# Patient Record
Sex: FEMALE | Race: WHITE | NOT HISPANIC OR LATINO | Employment: UNEMPLOYED | URBAN - NONMETROPOLITAN AREA
[De-identification: names, ages, dates, MRNs, and addresses within clinical notes are randomized per-mention and may not be internally consistent; named-entity substitution may affect disease eponyms.]

---

## 2024-05-08 ENCOUNTER — OFFICE VISIT (OUTPATIENT)
Dept: URGENT CARE | Facility: MEDICAL CENTER | Age: 39
End: 2024-05-08
Payer: COMMERCIAL

## 2024-05-08 VITALS
HEART RATE: 88 BPM | TEMPERATURE: 97.7 F | RESPIRATION RATE: 22 BRPM | DIASTOLIC BLOOD PRESSURE: 78 MMHG | OXYGEN SATURATION: 98 % | BODY MASS INDEX: 30.9 KG/M2 | SYSTOLIC BLOOD PRESSURE: 118 MMHG | WEIGHT: 180 LBS

## 2024-05-08 DIAGNOSIS — J02.9 PHARYNGITIS, UNSPECIFIED ETIOLOGY: Primary | ICD-10-CM

## 2024-05-08 DIAGNOSIS — T78.40XA ALLERGY, INITIAL ENCOUNTER: ICD-10-CM

## 2024-05-08 LAB — S PYO AG THROAT QL: NEGATIVE

## 2024-05-08 PROCEDURE — S9088 SERVICES PROVIDED IN URGENT: HCPCS

## 2024-05-08 PROCEDURE — 99213 OFFICE O/P EST LOW 20 MIN: CPT

## 2024-05-08 PROCEDURE — 87880 STREP A ASSAY W/OPTIC: CPT

## 2024-05-08 RX ORDER — LORATADINE 10 MG/1
10 TABLET ORAL DAILY
Qty: 30 TABLET | Refills: 0 | Status: SHIPPED | OUTPATIENT
Start: 2024-05-08

## 2024-05-08 RX ORDER — AZELASTINE 1 MG/ML
1 SPRAY, METERED NASAL 2 TIMES DAILY
Qty: 1 ML | Refills: 0 | Status: SHIPPED | OUTPATIENT
Start: 2024-05-08

## 2024-05-08 RX ORDER — MEDROXYPROGESTERONE ACETATE 150 MG/ML
INJECTION, SUSPENSION INTRAMUSCULAR
COMMUNITY
Start: 2024-04-30

## 2024-05-08 RX ORDER — ALBUTEROL SULFATE 90 UG/1
2 AEROSOL, METERED RESPIRATORY (INHALATION) EVERY 6 HOURS PRN
COMMUNITY
Start: 2023-12-21

## 2024-05-08 NOTE — LETTER
May 8, 2024     Patient: Amelia Anderson  YOB: 1985  Date of Visit: 5/8/2024      To Whom it May Concern:    Amelia Anderson is under my professional care. Amelia was seen in my office on 5/8/2024. Amelia may return to work on 5/9/2024 .    If you have any questions or concerns, please don't hesitate to call.         Sincerely,          ZEHRA Bates        CC: No Recipients

## 2024-05-08 NOTE — PATIENT INSTRUCTIONS
Using a daily antihistamine such as allegra, zyrtec, or claritin may be helpful. These can be purchased over the counter and taken as directed on package.  If handling animals, consider wearing gloves and washing hands after finished handling.   Take astelin nasal spray as directed.   Follow up with PCP if symptoms worsen. If breathing difficulty develops, please call 911 or proceed to nearest emergency room.

## 2024-05-08 NOTE — PROGRESS NOTES
[facility]        NAME: Amelia Anderson is a 39 y.o. female  : 1985    MRN: [id number]  DATE: May 8, 2024  TIME: 5:56 PM    Assessment and Plan   Sore throat [J02.9]  1. Sore throat  POCT rapid ANTIGEN strepA            Patient Instructions       Follow up with PCP in 3-5 days.  Proceed to  ER if symptoms worsen.    If tests are performed, our office will contact you with results only if changes need to made to the care plan discussed with you at the visit. [facility]    Chief Complaint     Chief Complaint   Patient presents with    Nasal Congestion    Cough    Sore Throat     Throat feel like its on fire. SX started 4 days ago. Has slight cough and nasal congestion. Needs refill on her albuterol inhaler. No fever or N/V. Had a little diarrhea yesterday.          History of Present Illness       Cough, congestion, chest tightness, sore throat x4 days. Pt states they recently got kittens 1 week ago and she is allergic to animal dander. States she had contact with the kittens as they need to be bottle fed. Denies fevers, + chills. Cough dry and nonproductive. 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     Cough  Associated symptoms include chills, ear pain, postnasal drip, rhinorrhea and a sore throat. Pertinent negatives include no fever.   Sore Throat   Associated symptoms include congestion, coughing and ear pain. Pertinent negatives include no abdominal pain, diarrhea or vomiting.       Review of Systems   Review of Systems   Constitutional:  Positive for chills and fatigue. Negative for fever.   HENT:  Positive for congestion, ear pain, postnasal drip, rhinorrhea and sore throat.    Respiratory:  Positive for cough.    Gastrointestinal:  Negative for abdominal pain, diarrhea, nausea and vomiting.         Current Medications       Current Outpatient Medications:     albuterol (PROVENTIL HFA,VENTOLIN  HFA) 90 mcg/act inhaler, Inhale 2 puffs every 6 (six) hours as needed, Disp: , Rfl:     medroxyPROGESTERone acetate (DEPO-PROVERA SYRINGE) 150 mg/mL injection, INJECT 1 MILLILITERS INTO MUSCLE FOR A 1 TIME DOSE, Disp: , Rfl:     famotidine (PEPCID) 20 mg tablet, Take 1 tablet (20 mg total) by mouth 2 (two) times a day (Patient not taking: Reported on 5/8/2024), Disp: 10 tablet, Rfl: 0    loratadine (CLARITIN) 10 mg tablet, Take 1 tablet (10 mg total) by mouth daily (Patient not taking: Reported on 5/8/2024), Disp: 10 tablet, Rfl: 0    mupirocin (BACTROBAN) 2 % cream, Apply topically 3 times per day and cover with a clean/dry bandage. (Patient not taking: Reported on 5/8/2024), Disp: 15 g, Rfl: 0    naproxen (NAPROSYN) 500 mg tablet, Take 1 tablet (500 mg total) by mouth 2 (two) times a day with meals (Patient not taking: Reported on 5/8/2024), Disp: 14 tablet, Rfl: 0    ofloxacin (OCUFLOX) 0.3 % ophthalmic solution, Administer 1 drop to both eyes every 2 (two) hours (Patient not taking: Reported on 5/8/2024), Disp: 5 mL, Rfl: 0    Current Allergies     Allergies as of 05/08/2024    (No Known Allergies)            The following portions of the patient's history were reviewed and updated as appropriate: allergies, current medications, past family history, past medical history, past social history, past surgical history and problem list.     Past Medical History:   Diagnosis Date    Asthma     Panic attack        History reviewed. No pertinent surgical history.    History reviewed. No pertinent family history.      Medications have been verified.        Objective   /78   Pulse 88   Temp 97.7 °F (36.5 °C)   Resp 22   Wt 81.6 kg (180 lb)   SpO2 98%   Breastfeeding No   BMI 30.90 kg/m²        Physical Exam     Physical Exam  Vitals and nursing note reviewed.   Constitutional:       General: She is not in acute distress.     Appearance: She is well-developed. She is not ill-appearing.   HENT:      Head:  Normocephalic and atraumatic.      Right Ear: Tympanic membrane normal. No drainage.      Left Ear: Tympanic membrane normal. No drainage.      Nose: Congestion and rhinorrhea (clear) present.      Right Turbinates: Swollen.      Left Turbinates: Swollen.      Mouth/Throat:      Mouth: Mucous membranes are dry.      Pharynx: Uvula midline. Posterior oropharyngeal erythema present.   Eyes:      Conjunctiva/sclera: Conjunctivae normal.      Pupils: Pupils are equal, round, and reactive to light.   Cardiovascular:      Rate and Rhythm: Normal rate and regular rhythm.      Heart sounds: Normal heart sounds.   Pulmonary:      Effort: Pulmonary effort is normal.      Breath sounds: Normal breath sounds.   Musculoskeletal:      Cervical back: Normal range of motion and neck supple.   Skin:     General: Skin is warm and dry.      Capillary Refill: Capillary refill takes less than 2 seconds.   Neurological:      General: No focal deficit present.      Mental Status: She is alert and oriented to person, place, and time.   Psychiatric:         Mood and Affect: Mood normal.         Behavior: Behavior normal.